# Patient Record
Sex: MALE | Race: WHITE | NOT HISPANIC OR LATINO | URBAN - METROPOLITAN AREA
[De-identification: names, ages, dates, MRNs, and addresses within clinical notes are randomized per-mention and may not be internally consistent; named-entity substitution may affect disease eponyms.]

---

## 2024-08-02 ENCOUNTER — PATIENT MESSAGE (OUTPATIENT)
Dept: NEUROLOGY | Facility: CLINIC | Age: 59
End: 2024-08-02
Payer: COMMERCIAL

## 2024-08-02 ENCOUNTER — TELEPHONE (OUTPATIENT)
Dept: NEUROLOGY | Facility: CLINIC | Age: 59
End: 2024-08-02

## 2024-08-02 NOTE — TELEPHONE ENCOUNTER
Patient called back, they have finish setting up for the portal. I offer them an appointment on September 6th at 2:40pm. Patient accepts the appointment. Patient asked questions on setting up the virtual visit. I inform the patient that I will be sending instructions patient was grateful.

## 2024-08-02 NOTE — TELEPHONE ENCOUNTER
Called patient today about setting up an appointment to see Dr. Rowley. Patient picks up the call, I inform the patient that inperson appointments are back up all the way in November. I suggest a virtual visit but the patient has not set up there portal yet. I inform the patient that there portal must be setup in order to do a virtual visit. Patient understands, he would rather do an inperson visit. Patient takes the November 5th appointment at 3:40pm. But informs me that he is going to talk to his neurologist and call me back.

## 2024-08-05 ENCOUNTER — TELEPHONE (OUTPATIENT)
Dept: NEUROLOGY | Facility: CLINIC | Age: 59
End: 2024-08-05
Payer: COMMERCIAL

## 2024-08-09 ENCOUNTER — LAB VISIT (OUTPATIENT)
Dept: LAB | Facility: HOSPITAL | Age: 59
End: 2024-08-09
Attending: PSYCHIATRY & NEUROLOGY
Payer: COMMERCIAL

## 2024-08-09 ENCOUNTER — OFFICE VISIT (OUTPATIENT)
Dept: NEUROLOGY | Facility: CLINIC | Age: 59
End: 2024-08-09
Payer: COMMERCIAL

## 2024-08-09 ENCOUNTER — PATIENT MESSAGE (OUTPATIENT)
Dept: NEUROLOGY | Facility: CLINIC | Age: 59
End: 2024-08-09

## 2024-08-09 VITALS
BODY MASS INDEX: 34.12 KG/M2 | HEART RATE: 78 BPM | WEIGHT: 251.88 LBS | SYSTOLIC BLOOD PRESSURE: 134 MMHG | DIASTOLIC BLOOD PRESSURE: 78 MMHG | HEIGHT: 72 IN

## 2024-08-09 DIAGNOSIS — R41.3 MEMORY CHANGE: Primary | ICD-10-CM

## 2024-08-09 DIAGNOSIS — G20.A2 PARKINSON'S DISEASE WITHOUT DYSKINESIA, WITH FLUCTUATING MANIFESTATIONS: ICD-10-CM

## 2024-08-09 DIAGNOSIS — R41.3 MEMORY CHANGE: ICD-10-CM

## 2024-08-09 PROCEDURE — 99999 PR PBB SHADOW E&M-EST. PATIENT-LVL III: CPT | Mod: PBBFAC,,, | Performed by: PSYCHIATRY & NEUROLOGY

## 2024-08-09 PROCEDURE — 84425 ASSAY OF VITAMIN B-1: CPT | Performed by: PSYCHIATRY & NEUROLOGY

## 2024-08-09 PROCEDURE — 36415 COLL VENOUS BLD VENIPUNCTURE: CPT | Performed by: PSYCHIATRY & NEUROLOGY

## 2024-08-09 RX ORDER — MODAFINIL 200 MG/1
200 TABLET ORAL 2 TIMES DAILY
COMMUNITY
Start: 2010-01-01

## 2024-08-09 RX ORDER — SILVER SULFADIAZINE 10 G/1000G
CREAM TOPICAL DAILY PRN
COMMUNITY
Start: 2024-01-04

## 2024-08-09 RX ORDER — SILDENAFIL 100 MG/1
100 TABLET, FILM COATED ORAL DAILY PRN
COMMUNITY
Start: 2018-01-01

## 2024-08-09 RX ORDER — PROPRANOLOL HYDROCHLORIDE 60 MG/1
60 CAPSULE, EXTENDED RELEASE ORAL DAILY
COMMUNITY
Start: 2023-10-01

## 2024-08-09 RX ORDER — ROSUVASTATIN CALCIUM 20 MG/1
20 TABLET, COATED ORAL DAILY
COMMUNITY
Start: 2023-10-01

## 2024-08-09 RX ORDER — EFINACONAZOLE 100 MG/ML
4 SOLUTION TOPICAL WEEKLY
COMMUNITY
Start: 2024-07-11

## 2024-08-09 RX ORDER — BUPROPION HYDROCHLORIDE 300 MG/1
300 TABLET ORAL DAILY
COMMUNITY
Start: 2014-01-01

## 2024-08-09 RX ORDER — RASAGILINE 1 MG/1
1 TABLET ORAL NIGHTLY
Qty: 30 TABLET | Refills: 11 | Status: SHIPPED | OUTPATIENT
Start: 2024-08-09 | End: 2025-08-09

## 2024-08-09 RX ORDER — AMLODIPINE BESYLATE 5 MG/1
5 TABLET ORAL DAILY
COMMUNITY
Start: 2014-01-01

## 2024-08-09 RX ORDER — CARBIDOPA AND LEVODOPA 25; 100 MG/1; MG/1
1 TABLET ORAL 3 TIMES DAILY
COMMUNITY
Start: 2024-03-01

## 2024-08-09 RX ORDER — METFORMIN HYDROCHLORIDE 500 MG/1
500 TABLET, EXTENDED RELEASE ORAL NIGHTLY
COMMUNITY
Start: 2019-01-01

## 2024-08-09 RX ORDER — FLUOXETINE HYDROCHLORIDE 40 MG/1
40 CAPSULE ORAL DAILY
COMMUNITY
Start: 2024-07-31 | End: 2025-07-31

## 2024-08-09 RX ORDER — DEXTROMETHORPHAN HYDROBROMIDE AND QUINIDINE SULFATE 20; 10 MG/1; MG/1
1 CAPSULE, GELATIN COATED ORAL 2 TIMES DAILY
COMMUNITY

## 2024-08-09 RX ORDER — MUPIROCIN 20 MG/G
22 OINTMENT TOPICAL DAILY PRN
COMMUNITY
Start: 2024-07-11

## 2024-08-09 RX ORDER — DIAZEPAM 5 MG/1
5 TABLET ORAL EVERY 8 HOURS PRN
COMMUNITY
Start: 2014-01-01

## 2024-08-13 LAB — VIT B1 BLD-MCNC: 65 UG/L (ref 38–122)

## 2024-11-22 ENCOUNTER — OFFICE VISIT (OUTPATIENT)
Dept: NEUROLOGY | Facility: CLINIC | Age: 59
End: 2024-11-22
Payer: COMMERCIAL

## 2024-11-22 DIAGNOSIS — G20.A2 PARKINSON'S DISEASE WITHOUT DYSKINESIA, WITH FLUCTUATING MANIFESTATIONS: Primary | ICD-10-CM

## 2024-11-22 NOTE — ASSESSMENT & PLAN NOTE
Typical iPD  He's thriving- no overt ontimes  Continue carbidopa/levodopa 25/100mg 1 tab PO TID   Take 2pm pill at 12pm  Continues rasagiline 1mg QHS

## 2024-11-22 NOTE — PROGRESS NOTES
The patient location is: HOME  The chief complaint leading to visit is: iPD  1. Parkinson's disease without dyskinesia, with fluctuating manifestations          Visit type: Virtual visit with synchronous audio and video    Face to Face time with patient: 20mins  20 minutes of total time spent on the encounter, which includes face to face time and non-face to face time preparing to see the patient (eg, review of tests), Obtaining and/or reviewing separately obtained history, Documenting clinical information in the electronic or other health record, Independently interpreting results (not separately reported) and communicating results to the patient/family/caregiver, or Care coordination (not separately reported).     Each patient to whom he or she provides medical services by telemedicine is:  (1) informed of the relationship between the physician and patient and the respective role of any other health care provider with respect to management of the patient; and (2) notified that he or she may decline to receive medical services by telemedicine and may withdraw from such care at any time.       [facility]    PCP/Referring Provider: No referring provider defined for this encounter.  Date of Service: 11/22/2024    Chief Complaint: iPD    Interval Hx    Since last visit,   Started rasagiline 1mg QHS    Continues carbidopa/levodopa 25/100mg 1 tab PO TID   6am/2pm/10pm  No offtimes but feels tired 1-3pm    Tremors affecting handiwork  Takes propranolol 20mg QAM    PD Review of Symptoms:  Anosmia: lost this in 1997  Dysarthria/Hypophonia: -  Dysphagia/Sialorrhea: -  Depression: -  Cognitive slowing: -  Hallucinations: -  Impulsivity: -  Obsessions/Compulsions: -  Urinary changes: -  Constipation: -  Orthostasis: -  Erectile Dysfunction: -  Dyskinesia: None  Falls: -  Freezing: -  Micrographia: -  Sleep issues:  -GINA: -  -RBD: yes      PriorHPI: Yomi Escobar is a L HANDED 59 y.o. male with a medical issues  significant for HTN, Depression, Lspine, b/l rotator cuff surgery, iPD coming for eval from Dr. Griffin here for second opinion re: PD care. He noted first R foot stiffness. At times has trouble stepping down stairs. Balance is slipping. Falls once every 2 months. Struggles to stand from low couches. Walks unassisted but feels unsure on ladders. This affected Next started feeling cognitively slower. Forgetting what he walked into rooms for. Poor concentration and cannot multitask.  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   HENT:  Negative for congestion.    Eyes:  Negative for double vision.   Respiratory:  Negative for cough and shortness of breath.    Cardiovascular:  Negative for chest pain and leg swelling.   Gastrointestinal:  Negative for nausea.   Genitourinary:  Negative for dysuria.   Musculoskeletal:  Positive for falls.   Skin:  Negative for rash.   Neurological:  Positive for tremors and speech change. Negative for headaches.   Psychiatric/Behavioral:  Positive for depression and memory loss.          Current Medications:  Outpatient Encounter Medications as of 11/22/2024   Medication Sig Dispense Refill    carbidopa-levodopa  mg (SINEMET)  mg per tablet Take 1 tablet by mouth 3 (three) times daily.      diazePAM (VALIUM) 5 MG tablet Take 5 mg by mouth every 8 (eight) hours as needed for Anxiety.      JUBLIA 10 % Parth Apply 4 mLs topically once a week.      metFORMIN (GLUCOPHAGE-XR) 500 MG ER 24hr tablet Take 500 mg by mouth every evening.      modafiniL (PROVIGIL) 200 MG Tab Take 200 mg by mouth 2 (two)  times daily.      mupirocin (BACTROBAN) 2 % ointment Apply 22 g topically daily as needed.      rasagiline (AZILECT) 1 mg Tab Take 1 tablet (1 mg total) by mouth every evening. 30 tablet 11    rosuvastatin (CRESTOR) 20 MG tablet Take 20 mg by mouth once daily.      sildenafiL (VIAGRA) 100 MG tablet Take 100 mg by mouth daily as needed for Erectile Dysfunction.      silver sulfADIAZINE 1% (SSD) 1 % cream Apply topically daily as needed.      amLODIPine (NORVASC) 5 MG tablet Take 5 mg by mouth once daily.      buPROPion (WELLBUTRIN XL) 300 MG 24 hr tablet Take 300 mg by mouth once daily.      FLUoxetine 40 MG capsule Take 40 mg by mouth once daily.      NUEDEXTA 20-10 mg per capsule Take 1 capsule by mouth 2 (two) times daily.      propranoloL (INDERAL LA) 60 MG 24 hr capsule Take 60 mg by mouth once daily.       No facility-administered encounter medications on file as of 11/22/2024.       Past Medical History:  Patient Active Problem List   Diagnosis    Parkinson's disease without dyskinesia, with fluctuating manifestations       Past Surgical History:  No past surgical history on file.    Social:  Social History     Socioeconomic History    Marital status: Significant Other     Social Drivers of Health     Financial Resource Strain: Low Risk  (8/5/2024)    Overall Financial Resource Strain (CARDIA)     Difficulty of Paying Living Expenses: Not hard at all   Food Insecurity: No Food Insecurity (8/5/2024)    Hunger Vital Sign     Worried About Running Out of Food in the Last Year: Never true     Ran Out of Food in the Last Year: Never true   Physical Activity: Insufficiently Active (8/5/2024)    Exercise Vital Sign     Days of Exercise per Week: 1 day     Minutes of Exercise per Session: 10 min   Stress: Stress Concern Present (8/5/2024)    [employer] - Occupational Stress Questionnaire     Feeling of Stress : To some extent   Housing Stability: High Risk (8/5/2024)    Housing Stability  Vital Sign     Unable to Pay for Housing in the Last Year: Yes       Family History:  No family history on file.    PHYSICAL:  There were no vitals taken for this visit.    General Medical Examination:  General: Good hygiene, appropriate appearance.  HEENT: Normocephalic, atraumatic.   Neck: Supple.   Chest: Unlabored breathing.   CV: Symmetric pulses.   Ext: No clubbing, cyanosis, or edema.     Mental Status:  Mood/Affect: Appropriate/congruent.  Level of consciousness: Awake, alert.  Orientation: Oriented to person, place, time and situation.  Language: No Dysarthria    Cranial nerves:  I: Not tested  II: PERRL, VFF to counting  III, IV, VI: EOMI with conjugate gaze and no nystagmus on end gaze - no SWJs  V: Facial sensation intact and symmetric over the bilateral V1-V3  VII: Facial muscle activation intact and symmetric over the bilateral upper and lower face  VIII: Hearing intact in the b/l ears and symmetrical to finger rub  IX, X, XII: TUP midline - no atrophy or fasiculations  X: SCMs and shoulder shrug full strength b/l and symmetric  MOTOR EXAMINATION (ON)       Speech  1 - Slight loss of expression, dictation, and/or volumn.   Facial Expression  2 - Slight but definitely abnormal diminution of facial expression.    Tremor at Rest:      Face, lips, chin 0 - Absent.    Hands:      right 0 - Absent.    left 0 - Absent.    Feet:      right 0 - Absent.    left 0 - Absent.    Action or Postural Tremor of Hands      right 0 - Absent.    left 0 - Absent.                            Finger Taps      right 1 - Mild slowing and/or reduction in amplitude.   left 0 - Normal.   Hand Movements      right 1   left 0 - Normal.   Rapid Alternating Movements of Hands      right 0 - Normal.   left 0 - Normal.   Leg Agility      right 0 - Normal.   left 0 - Normal.   Arising from Chair  1 - Slow; or may need more than one attempt.   Posture  0 - Normal erect.   Gait  1 - Walks slowly, may shuffle with short steps, but no  festination (hastening steps) or propulsion.   Postural Stability (Response to sudden, strong posterior displacement produced by pull on shoulders while patient erect with eyes open and feet slightly apart. Patient is prepared, and can have had some practice runs.)  1 - Retropulsion, but recovers unaided.    Body Bradykinesia and Hypokinesia (Combining slowness, hesitancy, decreased armswing, small amplitude, and poverty of movement in general)  1 - Minimal slowness, giving movement a deliberate character; could be normal for some persons. Possibly reduced amplitude.       Laboratory Data:  B12 OK 2023    Imaging:  NA    Assessment//Plan:   Problem List Items Addressed This Visit          Neuro    Parkinson's disease without dyskinesia, with fluctuating manifestations - Primary    Current Assessment & Plan     Typical iPD  He's thriving- no overt ontimes  Continue carbidopa/levodopa 25/100mg 1 tab PO TID   Take 2pm pill at 12pm  Continues rasagiline 1mg QHS                  Rosy Rowley MD, MS [facility]  Department of Neurology  Movement Disorders

## 2025-05-16 ENCOUNTER — PATIENT MESSAGE (OUTPATIENT)
Facility: CLINIC | Age: 60
End: 2025-05-16
Payer: COMMERCIAL

## 2025-07-07 RX ORDER — RASAGILINE 1 MG/1
1 TABLET ORAL NIGHTLY
Qty: 30 TABLET | Refills: 11 | Status: SHIPPED | OUTPATIENT
Start: 2025-07-07 | End: 2026-07-07

## 2025-07-29 ENCOUNTER — OFFICE VISIT (OUTPATIENT)
Facility: CLINIC | Age: 60
End: 2025-07-29
Payer: COMMERCIAL

## 2025-07-29 VITALS
HEIGHT: 72 IN | SYSTOLIC BLOOD PRESSURE: 131 MMHG | DIASTOLIC BLOOD PRESSURE: 79 MMHG | WEIGHT: 239.31 LBS | HEART RATE: 78 BPM | BODY MASS INDEX: 32.41 KG/M2

## 2025-07-29 DIAGNOSIS — G20.A2 PARKINSON'S DISEASE WITHOUT DYSKINESIA, WITH FLUCTUATING MANIFESTATIONS: Primary | ICD-10-CM

## 2025-07-29 DIAGNOSIS — R53.83 FATIGUE, UNSPECIFIED TYPE: ICD-10-CM

## 2025-07-29 PROCEDURE — 99999 PR PBB SHADOW E&M-EST. PATIENT-LVL III: CPT | Mod: PBBFAC,,, | Performed by: PSYCHIATRY & NEUROLOGY

## 2025-07-29 PROCEDURE — 99215 OFFICE O/P EST HI 40 MIN: CPT | Mod: S$GLB,,, | Performed by: PSYCHIATRY & NEUROLOGY

## 2025-07-29 PROCEDURE — 3008F BODY MASS INDEX DOCD: CPT | Mod: CPTII,S$GLB,, | Performed by: PSYCHIATRY & NEUROLOGY

## 2025-07-29 PROCEDURE — 3075F SYST BP GE 130 - 139MM HG: CPT | Mod: CPTII,S$GLB,, | Performed by: PSYCHIATRY & NEUROLOGY

## 2025-07-29 PROCEDURE — 3078F DIAST BP <80 MM HG: CPT | Mod: CPTII,S$GLB,, | Performed by: PSYCHIATRY & NEUROLOGY

## 2025-07-29 PROCEDURE — 1159F MED LIST DOCD IN RCRD: CPT | Mod: CPTII,S$GLB,, | Performed by: PSYCHIATRY & NEUROLOGY

## 2025-07-29 RX ORDER — CARBIDOPA AND LEVODOPA 25; 100 MG/1; MG/1
1 TABLET ORAL 4 TIMES DAILY
Qty: 120 TABLET | Refills: 11 | Status: SHIPPED | OUTPATIENT
Start: 2025-07-29 | End: 2026-07-29

## 2025-07-29 NOTE — PROGRESS NOTES
[facility]    PCP/Referring Provider: No referring provider defined for this encounter.  Date of Service: 7/29/2025    Chief Complaint: iPD    Interval Hx    Since last visit,   Thriving  Quicker thinking  Started rasagiline 1mg QHS    Qam valium 8am and modafinil  Continues carbidopa/levodopa 25/100mg 1 tab PO TID   6am/2pm/10pm    Has not fallen in months    Tremors affecting handiwork  Takes propranolol 20mg QAM    PD Review of Symptoms:  Anosmia: lost this in 1997  Dysarthria/Hypophonia: -  Dysphagia/Sialorrhea: -  Depression: -  Cognitive slowing: -  Hallucinations: -  Impulsivity: -  Obsessions/Compulsions: -  Urinary changes: -  Constipation: -  Orthostasis: -  Erectile Dysfunction: -  Dyskinesia: None  Falls: -  Freezing: -  Micrographia: -  Sleep issues:  -GINA: - yes- using cpap  -RBD: yes -        PriorHPI: Yomi Escobar is a L HANDED 59 y.o. male with a medical issues significant for HTN, Depression, Lspine, b/l rotator cuff surgery, iPD coming for eval from Dr. Griffin here for second opinion re: PD care. He noted first R foot stiffness. At times has trouble stepping down stairs. Balance is slipping. Falls once every 2 months. Struggles to stand from low couches. Walks unassisted but feels unsure on ladders. This affected Next started feeling cognitively slower. Forgetting what he walked into rooms for. Poor concentration and cannot multitask.  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   HENT:  Negative for congestion.    Eyes:  Negative for double vision.   Respiratory:  Negative for cough and  shortness of breath.    Cardiovascular:  Negative for chest pain and leg swelling.   Gastrointestinal:  Negative for nausea.   Genitourinary:  Negative for dysuria.   Musculoskeletal:  Positive for falls.   Skin:  Negative for rash.   Neurological:  Positive for tremors and speech change. Negative for headaches.   Psychiatric/Behavioral:  Positive for depression and memory loss. Suicidal ideas: Has n.         Current Medications:  Outpatient Encounter Medications as of 7/29/2025   Medication Sig Dispense Refill    buPROPion (WELLBUTRIN XL) 300 MG 24 hr tablet Take 300 mg by mouth once daily.      carbidopa-levodopa  mg (SINEMET)  mg per tablet Take 1 tablet by mouth 3 (three) times daily.      diazePAM (VALIUM) 5 MG tablet Take 5 mg by mouth every 8 (eight) hours as needed for Anxiety.      JUBLIA 10 % Parth Apply 4 mLs topically once a week.      metFORMIN (GLUCOPHAGE-XR) 500 MG ER 24hr tablet Take 500 mg by mouth every evening.      modafiniL (PROVIGIL) 200 MG Tab Take 200 mg by mouth 2 (two) times daily.      mupirocin (BACTROBAN) 2 % ointment Apply 22 g topically daily as needed.      rasagiline (AZILECT) 1 mg Tab Take 1 tablet (1 mg total) by mouth every evening. 30 tablet 11    rosuvastatin (CRESTOR) 20 MG tablet Take 20 mg by mouth once daily.      sildenafiL (VIAGRA) 100 MG tablet Take 100 mg by mouth daily as needed for Erectile Dysfunction.      silver sulfADIAZINE 1% (SSD) 1 % cream Apply topically daily as needed.      amLODIPine (NORVASC) 5 MG tablet Take 5 mg by mouth once daily.      carbidopa-levodopa  mg (SINEMET)  mg per tablet Take 1 tablet by mouth 4 (four) times daily. 120 tablet 11    FLUoxetine 40 MG capsule Take 40 mg by mouth once daily.      NUEDEXTA 20-10 mg per capsule Take 1 capsule by mouth 2 (two) times daily.      propranoloL (INDERAL LA) 60 MG 24 hr capsule Take 60 mg by mouth once daily.      [DISCONTINUED] rasagiline (AZILECT) 1 mg Tab Take 1 tablet (1 mg  total) by mouth every evening. 30 tablet 11     No facility-administered encounter medications on file as of 7/29/2025.       Past Medical History:  Patient Active Problem List   Diagnosis    Parkinson's disease without dyskinesia, with fluctuating manifestations    Fatigue       Past Surgical History:  No past surgical history on file.    Social:  Social History     Socioeconomic History    Marital status: Significant Other   Vaping Use    Vaping status: Never Used     Social Drivers of Health     Financial Resource Strain: Low Risk  (7/28/2025)    Overall Financial Resource Strain (CARDIA)     Difficulty of Paying Living Expenses: Not hard at all   Food Insecurity: No Food Insecurity (7/28/2025)    Hunger Vital Sign     Worried About Running Out of Food in the Last Year: Never true     Ran Out of Food in the Last Year: Never true   Transportation Needs: No Transportation Needs (7/28/2025)    PRAPARE - Transportation     Lack of Transportation (Medical): No     Lack of Transportation (Non-Medical): No   Physical Activity: Insufficiently Active (7/28/2025)    Exercise Vital Sign     Days of Exercise per Week: 2 days     Minutes of Exercise per Session: 60 min   Stress: Stress Concern Present (7/28/2025)    [employer] - Occupational Stress Questionnaire     Feeling of Stress : To some extent   Housing Stability: Low Risk  (7/28/2025)    Housing Stability Vital Sign     Unable to Pay for Housing in the Last Year: No     Number of Times Moved in the Last Year: 0     Homeless in the Last Year: No       Family History:  No family history on file.    PHYSICAL:  /79   Pulse 78   Ht 6' (1.829 m)   Wt 108.6 kg (239 lb 5 oz)   BMI 32.46 kg/m²     General Medical Examination:  General: Good hygiene, appropriate appearance.  HEENT: Normocephalic, atraumatic.   Neck: Supple.   Chest: Unlabored breathing.   CV: Symmetric pulses.   Ext: No clubbing, cyanosis, or edema.     Mental  Status:  Mood/Affect: Appropriate/congruent.  Level of consciousness: Awake, alert.  Orientation: Oriented to person, place, time and situation.  Language: No Dysarthria    Cranial nerves:  I: Not tested  II: PERRL, VFF to counting  III, IV, VI: EOMI with conjugate gaze and no nystagmus on end gaze - no SWJs  V: Facial sensation intact and symmetric over the bilateral V1-V3  VII: Facial muscle activation intact and symmetric over the bilateral upper and lower face  VIII: Hearing intact in the b/l ears and symmetrical to finger rub  IX, X, XII: TUP midline - no atrophy or fasiculations  X: SCMs and shoulder shrug full strength b/l and symmetric  MOTOR EXAMINATION (ON)       Speech  1 - Slight loss of expression, dictation, and/or volumn.   Facial Expression  2 - Slight but definitely abnormal diminution of facial expression.    Tremor at Rest:      Face, lips, chin 0 - Absent.    Hands:      right 1 - Absent.    left 1 - Absent.    Feet:      right 0 - Absent.    left 0 - Absent.    Action or Postural Tremor of Hands      right 0 - Absent.    left 0 - Absent.                            Finger Taps      right 1 - Mild slowing and/or reduction in amplitude.   left 0 - Normal.   Hand Movements      right 1   left 0 - Normal.   Rapid Alternating Movements of Hands      right 0 - Normal.   left 0 - Normal.   Leg Agility      right 0 - Normal.   left 0 - Normal.   Arising from Chair  1 - Slow; or may need more than one attempt.   Posture  0 - Normal erect.   Gait  1 - Walks slowly, may shuffle with short steps, but no festination (hastening steps) or propulsion.   Postural Stability (Response to sudden, strong posterior displacement produced by pull on shoulders while patient erect with eyes open and feet slightly apart. Patient is prepared, and can have had some practice runs.)  1 - Retropulsion, but recovers unaided.    Body Bradykinesia and Hypokinesia (Combining slowness, hesitancy, decreased armswing, small amplitude,  and poverty of movement in general)  1 - Minimal slowness, giving movement a deliberate character; could be normal for some persons. Possibly reduced amplitude.       Laboratory Data:   Ref Range & Units 10 mo ago   VITAMIN B-12 211 - 911 pcg/mL 563         Imaging:  NA    Assessment//Plan:   Problem List Items Addressed This Visit          Neuro    Parkinson's disease without dyskinesia, with fluctuating manifestations - Primary    Current Assessment & Plan   Typical iPD  He's thriving- however offtimes at night    Change carbidopa/levodopa 25/100mg 1 tab PO TID to QID to fill afternoon OFFtime  Continues rasagiline 1mg QHS              Other    Fatigue    Current Assessment & Plan   We dsicussed that taking  Qam valium 8am and modafinil same time may be conflicting  [place] move valium 5mg to QHS to help rem sleep jens Rowley MD, MS  [facility]  Department of Neurology  Movement Disorders

## 2025-07-29 NOTE — ASSESSMENT & PLAN NOTE
Typical iPD  He's thriving- however offtimes at night    Change carbidopa/levodopa 25/100mg 1 tab PO TID to QID to fill afternoon OFFtime  Continues rasagiline 1mg QHS

## 2025-07-29 NOTE — ASSESSMENT & PLAN NOTE
We dsicussed that taking  Qam valium 8am and modafinil same time may be conflicting  [place] move valium 5mg to QHS to help rem sleep dz

## 2025-07-30 ENCOUNTER — PATIENT MESSAGE (OUTPATIENT)
Facility: CLINIC | Age: 60
End: 2025-07-30
Payer: COMMERCIAL